# Patient Record
Sex: FEMALE | Race: WHITE | NOT HISPANIC OR LATINO | Employment: OTHER | URBAN - METROPOLITAN AREA
[De-identification: names, ages, dates, MRNs, and addresses within clinical notes are randomized per-mention and may not be internally consistent; named-entity substitution may affect disease eponyms.]

---

## 2022-12-07 ENCOUNTER — OFFICE VISIT (OUTPATIENT)
Dept: NEUROLOGY | Facility: CLINIC | Age: 67
End: 2022-12-07

## 2022-12-07 VITALS
HEIGHT: 63 IN | OXYGEN SATURATION: 98 % | HEART RATE: 72 BPM | SYSTOLIC BLOOD PRESSURE: 126 MMHG | DIASTOLIC BLOOD PRESSURE: 78 MMHG | WEIGHT: 199.8 LBS | BODY MASS INDEX: 35.4 KG/M2

## 2022-12-07 DIAGNOSIS — I67.89 CEREBRAL MICROVASCULAR DISEASE: ICD-10-CM

## 2022-12-07 DIAGNOSIS — R41.3 MEMORY LOSS: ICD-10-CM

## 2022-12-07 DIAGNOSIS — Z82.49 FAMILY HISTORY OF BRAIN ANEURYSM: ICD-10-CM

## 2022-12-07 DIAGNOSIS — G43.009 MIGRAINE WITHOUT AURA AND WITHOUT STATUS MIGRAINOSUS, NOT INTRACTABLE: Primary | ICD-10-CM

## 2022-12-07 PROBLEM — H61.23 BILATERAL IMPACTED CERUMEN: Status: ACTIVE | Noted: 2022-02-02

## 2022-12-07 PROBLEM — C44.310 BASAL CELL CARCINOMA OF FACE: Status: ACTIVE | Noted: 2022-02-16

## 2022-12-07 PROBLEM — J34.89 NASAL LESION: Status: ACTIVE | Noted: 2022-02-02

## 2022-12-07 PROBLEM — I20.9 ANGINA PECTORIS: Status: ACTIVE | Noted: 2022-12-07

## 2022-12-07 PROBLEM — E66.9 OBESITY (BMI 35.0-39.9 WITHOUT COMORBIDITY): Status: ACTIVE | Noted: 2022-02-16

## 2022-12-07 PROBLEM — G25.81 RESTLESS LEGS: Status: ACTIVE | Noted: 2017-02-24

## 2022-12-07 PROBLEM — M19.90 ARTHRITIS: Status: ACTIVE | Noted: 2017-02-24

## 2022-12-07 PROBLEM — E55.9 VITAMIN D DEFICIENCY: Status: ACTIVE | Noted: 2017-10-30

## 2022-12-07 PROBLEM — U07.1 COVID-19: Status: ACTIVE | Noted: 2022-02-14

## 2022-12-07 PROBLEM — Z98.890 HISTORY OF CARDIAC CATHETERIZATION: Status: ACTIVE | Noted: 2022-12-07

## 2022-12-07 PROBLEM — E07.9 DISORDER OF THYROID GLAND: Status: ACTIVE | Noted: 2017-02-24

## 2022-12-07 PROBLEM — H90.3 SENSORINEURAL HEARING LOSS (SNHL) OF BOTH EARS: Status: ACTIVE | Noted: 2022-02-02

## 2022-12-07 PROBLEM — F32.A DEPRESSIVE DISORDER: Status: ACTIVE | Noted: 2017-02-24

## 2022-12-07 PROBLEM — G56.00 CARPAL TUNNEL SYNDROME: Status: ACTIVE | Noted: 2017-02-24

## 2022-12-07 PROBLEM — IMO0002 DEGENERATION OF INTERVERTEBRAL DISC: Status: ACTIVE | Noted: 2017-02-24

## 2022-12-07 PROBLEM — M79.7 FIBROMYALGIA: Status: ACTIVE | Noted: 2017-02-24

## 2022-12-07 PROCEDURE — 99204 OFFICE O/P NEW MOD 45 MIN: CPT | Performed by: NURSE PRACTITIONER

## 2022-12-07 RX ORDER — METHOCARBAMOL 500 MG/1
500 TABLET, FILM COATED ORAL 3 TIMES DAILY
COMMUNITY
Start: 2022-11-21

## 2022-12-07 RX ORDER — UBROGEPANT 50 MG/1
50 TABLET ORAL AS NEEDED
COMMUNITY
Start: 2022-12-01

## 2022-12-07 RX ORDER — ASPIRIN 81 MG/1
81 TABLET ORAL DAILY
COMMUNITY

## 2022-12-07 RX ORDER — OMEPRAZOLE 20 MG/1
20 CAPSULE, DELAYED RELEASE ORAL DAILY
COMMUNITY
Start: 2022-11-21

## 2022-12-07 RX ORDER — POTASSIUM CHLORIDE 750 MG/1
10 TABLET, EXTENDED RELEASE ORAL DAILY
COMMUNITY
Start: 2022-11-21

## 2022-12-07 RX ORDER — FLUTICASONE FUROATE, UMECLIDINIUM BROMIDE AND VILANTEROL TRIFENATATE 100; 62.5; 25 UG/1; UG/1; UG/1
1 POWDER RESPIRATORY (INHALATION)
COMMUNITY
Start: 2022-12-06

## 2022-12-07 RX ORDER — FUROSEMIDE 40 MG/1
40 TABLET ORAL DAILY
COMMUNITY
Start: 2022-11-08

## 2022-12-07 RX ORDER — ALBUTEROL SULFATE 90 UG/1
2 AEROSOL, METERED RESPIRATORY (INHALATION) EVERY 6 HOURS PRN
COMMUNITY
Start: 2022-12-06

## 2022-12-07 RX ORDER — MONTELUKAST SODIUM 10 MG/1
10 TABLET ORAL NIGHTLY
COMMUNITY
Start: 2022-10-28

## 2022-12-07 RX ORDER — MOLNUPIRAVIR 200 MG/1
800 CAPSULE ORAL EVERY 12 HOURS
COMMUNITY
Start: 2022-11-14

## 2022-12-07 RX ORDER — MELOXICAM 15 MG/1
15 TABLET ORAL DAILY
COMMUNITY
Start: 2022-11-21

## 2022-12-07 RX ORDER — HYDROXYZINE HYDROCHLORIDE 25 MG/1
25 TABLET, FILM COATED ORAL EVERY 8 HOURS PRN
COMMUNITY
Start: 2022-11-08

## 2022-12-07 RX ORDER — NYSTATIN 100000 [USP'U]/G
POWDER TOPICAL 2 TIMES DAILY
COMMUNITY
Start: 2022-11-29

## 2022-12-07 RX ORDER — MAGNESIUM OXIDE 400 MG/1
400 TABLET ORAL NIGHTLY
Qty: 90 TABLET | Refills: 3 | Status: SHIPPED | OUTPATIENT
Start: 2022-12-07

## 2022-12-07 RX ORDER — PENTOSAN POLYSULFATE SODIUM 100 MG/1
100 CAPSULE, GELATIN COATED ORAL
COMMUNITY
Start: 2022-11-29

## 2022-12-07 RX ORDER — ROPINIROLE 3 MG/1
3 TABLET, FILM COATED ORAL NIGHTLY
COMMUNITY
Start: 2022-11-29

## 2022-12-07 RX ORDER — HYDROCHLOROTHIAZIDE 25 MG/1
25 TABLET ORAL DAILY
COMMUNITY
Start: 2022-12-06

## 2022-12-07 RX ORDER — SPIRONOLACTONE 25 MG/1
25 TABLET ORAL DAILY
COMMUNITY
Start: 2022-12-06

## 2022-12-07 RX ORDER — CITALOPRAM 40 MG/1
40 TABLET ORAL EVERY MORNING
COMMUNITY
Start: 2022-11-29

## 2022-12-07 RX ORDER — ISOSORBIDE MONONITRATE 30 MG/1
30 TABLET, EXTENDED RELEASE ORAL DAILY
COMMUNITY
Start: 2022-11-08

## 2022-12-07 RX ORDER — LEVOTHYROXINE SODIUM 0.05 MG/1
50 TABLET ORAL
COMMUNITY
Start: 2022-11-21

## 2022-12-07 NOTE — PROGRESS NOTES
Subjective:     Patient ID: Nahed Nj is a 67 y.o. female.    CC:   Chief Complaint   Patient presents with   • Memory Loss       HPI:   History of Present Illness   Nahed Nj is a 67 y.o. female who comes to clinic today for evaluation of memory loss . She has noted symptoms since at least 2020 marked initially by forgetfulness , repetitiveness , word-finding difficulties  and exacerbated by COVID-19 infection in November 2021. Loses her train of thought. This has gradually worsened  over time. Additional symptoms have included impairments in short term memory and daughter helps with paying bills. There have been associated  symptoms of anxiety , depression , agitation  and does have sleep apnea. She denies  impairments in ADL's. She manages her medications , finances and daughter has started helping with bills. She continues to drive.  . She is currently residing at residing at home with , daughter & daughter's male partner and grandchildren.     Today 12/7/2022-This is a 67-year-old female who is referred here for evaluation of memory difficulties. She did undergo an MRI of the brain without contrast at [facility] on 09/12/2022; this was an evaluation for headaches, dizziness, memory loss, and blurred vision. She was referred to neurology for memory complaints. She is being treated for migraines with Ubrelvy as needed. Referred here for further evaluation.    Today, she reports her memory is getting worse. She notes her  recommended that she write her concerns on her phone, which she has brought with her today. Her  drove her here today due to a migraine but stayed in the car. She reports she has had issues with her memory all of her life. She states she is unable to remember things from before the age of 6. She is unsure if she experienced any trauma. She states her memory problems started worsening in 2020, and she discussed this with her primary care provider, who  "then ordered an MRI. She states her memory has worsened since having COVID-19 in 11/2021 and most recently in November 2022. She notes her short-term memory is awful. She states she can be talking to someone, and in the middle of a sentence her memory will \"drop,\" and she will have no idea what the next word was going to be. She states she will think about saying something, forget what it was, and will not be able to remember. She states she is aware that her memory is worsening. She notes she has difficulty with spelling. She reports her family is worried about her memory and that they have noticed a change in her memory. She states she has a family history of Alzheimer's disease in both of her mother's half-sisters. She notes one of her mother's half-sisters may have had vascular dementia. She reports her mother had a ruptured brain aneurysm which led to her death.     Her highest level of education is 12th grade. 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  She denies any head injury, concussion, trauma to her head, or any seizures.    She confirms she is able to get dressed and go to the bathroom independently. She reports her daughter has started paying bills. She puts her medications out 3 weeks at a time, and confirms she is able to manage this herself. She reports she is still driving. She denies any safety concerns in regards to her driving. She states she is still alert when driving, and knows where she is going. She lives at home with her , her daughter, her daughter's partner, and her grandchildren. She notes after her daughter's partner moved in, she was very agitated, but states this has improved. She notes \"there is stress there\" in regards to her living situation.     She reports her headaches are located all over her head, but the headache she has today is located in the front and in the back of her head. She notes her headaches move all around " her head. She states she has had headaches for approximately 2 years, but they are now getting worse. She experienced headaches when she was younger, but these resolved, and then restarted 2 years ago. She does not recall anything bringing her headaches on 2 years ago. She was experiencing daily headaches until last month when she contracted COVID-19 for the second time, and did not experience any headaches while she had COVID-19. Once she recovered from COVID-19, her headaches returned. She experiences phonophobia and photophobia. She reports she gets very stressed and agitated when she has a headache. She describes the pain as dull and throbbing, moderate pain, can last all day and sometimes days. She confirms she was prescribed Ubrelvy by her primary care provider, SARAH Rojas, to take as needed and this is very helpful as needed. She denies taking any medication daily for her headaches specifically. She reports she will occasionally take 4 tablets of Tylenol 500 mg; she denies taking this more than 15 days per month. Beta Blockers would be contraindicated with her COPD. Topamax would not be recommended with her memory loss as this can exacerbate this symptom. She took gabapentin in past and she was taken off due to her memory. She has been on citalopram for her anxiety and depression but this has not helped her migraines. She has not taken depakote.    She notes her vision is getting worse, and believes it is worse with her migraines. She denies experiencing any loss of vision, or spots in her vision. It has been over 1 year since her last eye exam. She notes her vision and hearing worsened with COVID-19, and states her hearing and vision have not returned to normal. She reports she saw an otolaryngologist who advised she has nerve damage, and that her hearing would most likely not return. She reports her hearing and vision difficulties began after she contracted COVID-19 in 11/2021. She notes she  almost  from COVID-19 and was hospitalized for almost 2 weeks. She states she still uses oxygen at night and when she is traveling in the car, and will use this during the day as needed. She notes that she is dizzy and off balance, and states the being off balance is getting worse. She denies experiencing falls; she notes she catches herself.    She confirms she has insomnia, or trouble with sleeping. She states that she will occasionally not go to sleep for 24 hours, which is new for her. She reports she can then also sleep for 24 hours. She confirms she has had a sleep study completed. She reports she has sleep apnea. She states that she has to have another sleep study completed because her granddaughter interfered with the gauges on her machine, so it was not working properly. She states that she did not go back to see her pulmonologist.     She reports her depression and anxiety are both worse. She notes she is agitated. She confirms she is being treated for depression, but has not told her primary care provider that it is getting worse. She reports she recently had her yearly wellness checkup with primary care, but she forgot to mention her other concerns. She confirms she is currently taking citalopram. She denies any delusions or hallucinations. She reports she will occasionally cry if she is unable to remember something.     She denies a history of CVAs or myocardial infarction. She states she was believed to have potentially had a myocardial infarction, but she is unsure if she did or not. She states she has congestive heart failure. She confirms that she is on 2 inhalers and Singulair. Her cardiologist is Dr. Micky Saldivar in [place], [place]. She reports she only takes aspirin when she has chest pains. She confirms she has fibromyalgia. She states she has been treated with gabapentin for her fibromyalgia, and confirms she found this helpful, but reports she was taken off of this when she had COVID-19  due to concerns of this affecting her memory. She states she is now taking something else for her fibromyalgia. She reports she took Tegretol for her trigeminal neuralgia. She states she has lupus and Raynaud's disease. She confirms she takes thyroid medication daily. She denies having any high blood pressure, high cholesterol, or diabetes. She denies ever being a smoker. She reports she has been experiencing chest pains more frequently in the past 2 weeks. She states she forgot to tell her primary care provider about her chest pains.    She states she has trouble ambulating due to her back pain. She reports she has degeneration, and notes when she sits for a few minutes, it takes her a while to get up. She confirms she uses a cane or a walker. She denies seeing a pain specialist or an orthopedic specialist. She has not completed physical therapy. She reports she had to get an injection in her back last week because it was spasming, but did not find this as helpful as she has in the past. She notes she is unsure if her back is getting worse.     The following portions of the patient's history were reviewed and updated as appropriate: allergies, current medications, past family history, past medical history, past social history, past surgical history and problem list.    Past Medical History:   Diagnosis Date   • Asthma    • Basal cell carcinoma    • Bladder problem    • CAD (coronary artery disease)    • Carpal tunnel syndrome    • Congestive heart failure (CHF) (HCC)    • Depression    • Difficulty walking    • HL (hearing loss)    • Lupus (HCC)    • Memory loss    • Migraine    • Movement disorder    • Osteoporosis    • Thyroid disease    • Trigeminal neuralgia        Past Surgical History:   Procedure Laterality Date   • BASAL CELL CARCINOMA EXCISION  2022    [facility] (2020,2021 and 2022)   • BLADDER SURGERY  2013    [facility]   • BREAST LUMPECTOMY  1971   • CHOLECYSTECTOMY  1990   •  HYSTERECTOMY  1978    [facility] [place]       Social History     Socioeconomic History   • Marital status:    Tobacco Use   • Smoking status: Never   • Smokeless tobacco: Never   Vaping Use   • Vaping Use: Never used   Substance and Sexual Activity   • Alcohol use: Never   • Drug use: Never   • Sexual activity: Defer       Family History   Problem Relation Age of Onset   • Heart disease Mother    • Arthritis Mother    • Aneurysm Mother    • Diabetes Father    • Heart disease Father    • Neuropathy Father    • Heart disease Sister    • Arthritis Sister    • Dementia Sister    • Kidney disease Sister    • Heart disease Brother    • Arthritis Brother    • Alzheimer's disease Maternal Aunt    • Breast cancer Maternal Aunt    • Migraines Daughter    • Arthritis Daughter    • Osteoporosis Daughter    • Arthritis Niece    • Brain cancer Niece    • Migraines Niece    • Mental illness Nephew           Current Outpatient Medications:   •  albuterol sulfate  (90 Base) MCG/ACT inhaler, Inhale 2 puffs Every 6 (Six) Hours As Needed for Wheezing., Disp: , Rfl:   •  aspirin 81 MG EC tablet, Take 81 mg by mouth Daily., Disp: , Rfl:   •  citalopram (CeleXA) 40 MG tablet, Take 40 mg by mouth Every Morning., Disp: , Rfl:   •  Elmiron 100 MG capsule, Take 100 mg by mouth 3 (Three) Times a Day Before Meals., Disp: , Rfl:   •  furosemide (LASIX) 40 MG tablet, Take 40 mg by mouth Daily., Disp: , Rfl:   •  hydroCHLOROthiazide (HYDRODIURIL) 25 MG tablet, Take 25 mg by mouth Daily., Disp: , Rfl:   •  hydrOXYzine (ATARAX) 25 MG tablet, Take 25 mg by mouth Every 8 (Eight) Hours As Needed., Disp: , Rfl:   •  isosorbide mononitrate (IMDUR) 30 MG 24 hr tablet, Take 30 mg by mouth Daily., Disp: , Rfl:   •  Lagevrio 200 MG capsule, Take 800 mg by mouth Every 12 (Twelve) Hours., Disp: , Rfl:   •  levothyroxine (SYNTHROID, LEVOTHROID) 50 MCG tablet, Take 50 mcg by mouth Every Morning., Disp: , Rfl:   •  magnesium oxide (MAG-OX) 400 MG  tablet, Take 1 tablet by mouth Every Night., Disp: 90 tablet, Rfl: 3  •  meloxicam (MOBIC) 15 MG tablet, Take 15 mg by mouth Daily., Disp: , Rfl:   •  methocarbamol (ROBAXIN) 500 MG tablet, Take 500 mg by mouth 3 (Three) Times a Day., Disp: , Rfl:   •  montelukast (SINGULAIR) 10 MG tablet, Take 10 mg by mouth Every Night., Disp: , Rfl:   •  nystatin (MYCOSTATIN) 813300 UNIT/GM powder, Apply  topically to the appropriate area as directed 2 (Two) Times a Day., Disp: , Rfl:   •  omeprazole (priLOSEC) 20 MG capsule, Take 20 mg by mouth Daily., Disp: , Rfl:   •  potassium chloride (K-DUR,KLOR-CON) 10 MEQ CR tablet, Take 10 mEq by mouth Daily., Disp: , Rfl:   •  Riboflavin 100 MG capsule, Take 200 mg by mouth Every Night., Disp: 180 each, Rfl: 3  •  rOPINIRole (REQUIP) 3 MG tablet, Take 3 mg by mouth Every Night., Disp: , Rfl:   •  spironolactone (ALDACTONE) 25 MG tablet, Take 25 mg by mouth Daily., Disp: , Rfl:   •  Trelegy Ellipta 100-62.5-25 MCG/ACT inhaler, Inhale 1 puff Daily., Disp: , Rfl:   •  ubrogepant tablet, 50 mg As Needed., Disp: , Rfl:      Review of Systems   Constitutional: Negative for chills, fatigue, fever and unexpected weight change.   HENT: Positive for hearing loss. Negative for ear pain, nosebleeds, rhinorrhea and sore throat.    Eyes: Positive for photophobia and visual disturbance. Negative for pain, discharge and itching.   Respiratory: Negative for cough, chest tightness, shortness of breath and wheezing.    Cardiovascular: Positive for chest pain (intermittent but none today, comes and goes, has had this often, knows to contact cardiology and pcp-also if this returns to go to ER). Negative for palpitations and leg swelling.   Gastrointestinal: Negative for abdominal pain, blood in stool, constipation, diarrhea, nausea and vomiting.   Genitourinary: Negative for dysuria, frequency, hematuria and urgency.   Musculoskeletal: Positive for back pain and gait problem. Negative for arthralgias, joint  "swelling, myalgias, neck pain and neck stiffness.   Skin: Negative for rash and wound.   Allergic/Immunologic: Negative for environmental allergies and food allergies.   Neurological: Positive for dizziness and headaches. Negative for tremors, seizures, syncope, speech difficulty, weakness, light-headedness and numbness.   Hematological: Negative for adenopathy. Does not bruise/bleed easily.   Psychiatric/Behavioral: Positive for agitation, behavioral problems, decreased concentration, dysphoric mood and sleep disturbance. Negative for confusion, hallucinations and suicidal ideas. The patient is nervous/anxious.         Objective:  /78   Pulse 72   Ht 160 cm (63\")   Wt 90.6 kg (199 lb 12.8 oz)   SpO2 98%   BMI 35.39 kg/m²     Neurologic Exam     Mental Status   Oriented to person.   Oriented to place.   Disoriented to time.   Registration: recalls 3 of 3 objects. Recall at 5 minutes: recalls 3 of 3 objects. Follows 3 step commands.   Attention: decreased. Concentration: decreased.   Speech: speech is normal   Level of consciousness: alert  Knowledge: good. Able to perform simple calculations.   Able to name object. Able to read. Able to repeat. Able to write. Abnormal comprehension.     Cranial Nerves   Cranial nerves II through XII intact.     Motor Exam   Muscle bulk: normal  Overall muscle tone: normal    Strength   Strength 5/5 throughout.     Gait, Coordination, and Reflexes     Gait  Gait: wide-based (severe antalgia, notes severe low back pain chronic and poor balance, reports this improves with more ambulation, no assistive device)    Coordination   Romberg: negative  Finger to nose coordination: normal  Heel to shin coordination: normal  Tandem walking coordination: normal    Tremor   Resting tremor: absent  Intention tremor: absent  Action tremor: absent    Reflexes   Right brachioradialis: 2+  Left brachioradialis: 2+  Right biceps: 2+  Left biceps: 2+  Right patellar: 2+  Left patellar: " 2+  Right achilles: 2+  Left achilles: 2+  Right : 2+  Left : 2+  Right plantar: normal  Left plantar: normal  Right Healy: absent  Left Healy: absent  Right ankle clonus: absent  Left ankle clonus: absent      Physical Exam  Constitutional:       Appearance: Normal appearance. She is obese.      Comments: BMI 34.5   Cardiovascular:      Rate and Rhythm: Normal rate and regular rhythm.      Heart sounds: Normal heart sounds, S1 normal and S2 normal.   Pulmonary:      Effort: Pulmonary effort is normal.      Breath sounds: Normal breath sounds.   Musculoskeletal:      Lumbar back: Spasms and tenderness present. No swelling, edema, deformity, signs of trauma, lacerations or bony tenderness. Decreased range of motion. Negative right straight leg raise test and negative left straight leg raise test. No scoliosis.      Comments:   Reports chronic and just had injections last week   Neurological:      Mental Status: She is alert.      Cranial Nerves: Cranial nerves 2-12 are intact.      Motor: Motor strength is normal.      Coordination: Finger-Nose-Finger Test, Heel to Shin Test and Romberg Test normal.      Gait: Tandem walk normal.      Deep Tendon Reflexes:      Reflex Scores:       Bicep reflexes are 2+ on the right side and 2+ on the left side.       Brachioradialis reflexes are 2+ on the right side and 2+ on the left side.       Patellar reflexes are 2+ on the right side and 2+ on the left side.       Achilles reflexes are 2+ on the right side and 2+ on the left side.  Psychiatric:         Mood and Affect: Mood is anxious and depressed.         Speech: Speech normal.         Behavior: Behavior normal.         Thought Content: Thought content normal.         Cognition and Memory: She exhibits impaired recent memory (reported).         Judgment: Judgment normal.     MMSE is a 28 out of 30, with 3 out of 3 for word recall today.    Results:  She did undergo an MRI of the brain without contrast at [facility] on 09/12/2022. Per radiology, no acute intracranial abnormalities. Found moderate white matter changes mildly progressed compared to 11/17/2020 imaging; likely secondary to chronic microvascular ischemic changes. No other abnormalities.    I was able to personally review MRI, and she does have some moderate white matter changes around the left frontal horn, greater than the right frontal horn, but again no acute abnormalities. No significant brainstem or cerebellar abnormalities noted.    Her vitamin D level with PCP on 08/10/2022 was 29.4 ng/mL.   CMP within normal limits.   CBC overall looked good.   Vitamin B12 level 911 pg/mL.  Folate 7.4 ng/mL.     Assessment/Plan:       Diagnoses and all orders for this visit:    1. Migraine without aura and without status migrainosus, not intractable (Primary)  -     magnesium oxide (MAG-OX) 400 MG tablet; Take 1 tablet by mouth Every Night.  Dispense: 90 tablet; Refill: 3  -     Riboflavin 100 MG capsule; Take 200 mg by mouth Every Night.  Dispense: 180 each; Refill: 3    2. Memory loss  Comments:  multifactorial  Orders:  -     Ambulatory Referral to Speech Therapy    3. Family history of brain aneurysm  Comments:  mom with ruptured brain aneurysm-needs MRA head screening  Orders:  -     MRI Angiogram Head With & Without Contrast; Future    4. Cerebral microvascular disease  Comments:  aspirin 81mg daily         She has multifactorial etiology for her symptoms. For now, we are going to start her on supplements to help with her migraines. Triptans would be contraindicated, would not recommend Topamax. Previously tried gabapentin, and this worsened her memory. Cannot take beta blockers with baseline respiratory issues. Discussed anxiety, depression, fibromyalgia, chronic pain, and sleep apnea can all negatively affect memory.     MMSE was reassuring today. We will complete a Jay cognitive assessment next time, and we have referred her for cognitive rehab  with our speech therapist.     I recommended she take aspirin 81 mg daily. She does have moderate chronic small vessel ischemic changes. Recommended fasting lipid panel with PCP when able. Ventricles appear normal in size. I currently do not suspect a neurodegenerative etiology of her symptoms, but we will continue to monitor her closely.    She also has some pretty significant gait issues with her low back pain, and I discussed talking with her PCP about doing some physical therapy or seeing a specialist for that. She tells me she does not have time for physical therapy as she is helping take care of her grandchild. I did recommend she get evaluated by primary care, and consider seeing a specialist.    She should continue to follow with all specialists. If her headaches are not improving, we could certainly consider low dose Depakote, or consider an injection for CGRP for prevention. Would need to get clearance from her cardiologist, Micky Saldivar M.D. In regards to intermittent chest pain, not present today, she should contact PCP and Cardiology. If this become worsened or constant, she should proceed to nearest ER.    She verbalizes understanding, and agrees with plan today. She is going to follow up in 6 months.    Reviewed medications, potential side effects and signs and symptoms to report. Discussed risk versus benefits of treatment plan with patient and/or family-including medications, labs and radiology that may be ordered. Addressed questions and concerns during visit. Patient and/or family verbalized understanding and agree with plan.    AS THE PROVIDER, I PERSONALLY WORE PPE DURING ENTIRE FACE TO FACE ENCOUNTER IN CLINIC WITH THE PATIENT. PATIENT ALSO WORE PPE DURING ENTIRE FACE TO FACE ENCOUNTER EXCEPT FOR A MAX OF 30 SECONDS DURING NEUROLOGICAL EVALUATION OF CRANIAL NERVES AND THEN MASK WAS PLACED BACK OVER PATIENT FACE FOR REMAINDER OF VISIT. I WASHED MY HANDS BEFORE AND AFTER VISIT.    During this  visit the following were done:  Labs Reviewed [x]    Labs Ordered []    Radiology Reports Reviewed [x]    Radiology Ordered [x]    PCP Records Reviewed [x]    Referring Provider Records Reviewed []    ER Records Reviewed []    Hospital Records Reviewed []    History Obtained From Family []    Radiology Images Reviewed [x]    Other Reviewed []    Records Requested []      Transcribed from ambient dictation for SARAH Gamino by Dorinda Díaz.  12/07/22   18:05 EST    Patient or patient representative verbalized consent to the visit recording.  I have personally performed the services described in this document as transcribed by the above individual, and it is both accurate and complete.  SARAH Gamino  12/8/2022  06:52 EST

## 2023-01-06 ENCOUNTER — TELEPHONE (OUTPATIENT)
Dept: NEUROLOGY | Facility: CLINIC | Age: 68
End: 2023-01-06
Payer: MEDICARE

## 2023-01-06 DIAGNOSIS — Z82.49 FAMILY HISTORY OF BRAIN ANEURYSM: Primary | ICD-10-CM

## 2023-01-06 NOTE — TELEPHONE ENCOUNTER
Provider: NELLIE FOOTE  Caller: JULISSA PEREZ [facility] SCHEDULING  Relationship to Patient: NA  Phone Number: [phone]  Reason for Call: JULISSA CALLED TO STATE THE ORDERS FOR PATIENT'S MRI SHOULD STATE MRA HEAD WITHOUT CONTRAST INSTEAD OF MRI Angiogram Head With & Without Contrast. PLEASE REFAX ORDERS.    FAX# [phone]    THANK YOU.

## 2023-01-09 ENCOUNTER — HOSPITAL ENCOUNTER (OUTPATIENT)
Dept: SPEECH THERAPY | Facility: HOSPITAL | Age: 68
Setting detail: THERAPIES SERIES
Discharge: HOME | End: 2023-01-09
Payer: MEDICARE

## 2023-01-09 DIAGNOSIS — K21.9 CHRONIC GERD: ICD-10-CM

## 2023-01-09 DIAGNOSIS — R41.3 MEMORY LOSS: Primary | ICD-10-CM

## 2023-01-09 PROCEDURE — 92523 SPEECH SOUND LANG COMPREHEN: CPT

## 2023-01-09 NOTE — THERAPY EVALUATION
Outpatient Speech Language Pathology   Adult Speech Language Cognitive Initial Evaluation  [facility]     Patient Name: Nahed Nj  : 1955  MRN: [id number]  Today's Date: 2023        Visit Date: 2023   Patient Active Problem List   Diagnosis   • Angina pectoris (HCC)   • Arthritis   • Basal cell carcinoma of face   • Bilateral impacted cerumen   • Carpal tunnel syndrome   • COVID-19   • Degeneration of intervertebral disc   • Depressive disorder   • Disorder of thyroid gland   • Fibromyalgia   • History of cardiac catheterization   • Nasal lesion   • Obesity (BMI 35.0-39.9 without comorbidity)   • Restless legs   • Sensorineural hearing loss (SNHL) of both ears   • Vitamin D deficiency   • Migraine without aura and without status migrainosus, not intractable   • Memory loss   • Family history of brain aneurysm   • Cerebral microvascular disease        Past Medical History:   Diagnosis Date   • Asthma    • Basal cell carcinoma    • Bladder problem    • CAD (coronary artery disease)    • Carpal tunnel syndrome    • Congestive heart failure (CHF) (HCC)    • Depression    • Difficulty walking    • HL (hearing loss)    • Lupus (HCC)    • Memory loss    • Migraine    • Movement disorder    • Osteoporosis    • Thyroid disease    • Trigeminal neuralgia         Past Surgical History:   Procedure Laterality Date   • BASAL CELL CARCINOMA EXCISION      Nose Paola Chang (, and )   • BLADDER SURGERY      [facility]   • BREAST LUMPECTOMY     • CHOLECYSTECTOMY     • HYSTERECTOMY      PARTIAL [place]         Visit Dx:    ICD-10-CM ICD-9-CM   1. Memory loss  R41.3 780.93            OP SLP Assessment/Plan - 23 1400        SLP Assessment    Functional Problems Speech Language- Adult/Cognition  -    Impact on Function: Adult Speech Language/Cognition Restrictions in personal and social life;Trouble learning or remembering new information;Poor attention  to task  -HG    Clinical Impression: Speech Language-Adult/Congnition Mild:;Cognitive Communication Impairment  -HG    Functional Problems Comment Pt reports difficulty with recall of recent information,  confirms  -HG    Clinical Impression Comments RBANS Total score of 110 places pt in the High average.  -HG    Please refer to paper survey for additional self-reported information Yes  -HG    Please refer to items scanned into chart for additional diagnostic informaiton and handouts as provided by clinician Yes  -HG    SLP Diagnosis Mild Cog comm impairment  -HG    Prognosis Excellent (comment)  -HG    Patient/caregiver participated in establishment of treatment plan and goals Yes  -HG    Patient would benefit from skilled therapy intervention Yes  -HG       SLP Plan    Frequency 1x every other week  -HG    Duration 12 weeks  -HG    Planned CPT's? SLP SPEECH & LANGUAGE EVAL: 97285;SLP INDIVIDUAL SPEECH THERAPY: 32167  -    Expected Duration of Therapy Session (SLP Eval) 60  -HG    Plan Comments Initiate Cog tx.  -HG          User Key  (r) = Recorded By, (t) = Taken By, (c) = Cosigned By    Initials Name Provider Type     Mel Valdes MS CCC-SLP Speech and Language Pathologist                 SLP SLC Evaluation - 01/09/23 1400        Communication Assessment/Intervention    Document Type evaluation  -HG    Subjective Information no complaints  -HG    Patient Observations alert;cooperative;agree to therapy  -HG    Patient/Family/Caregiver Comments/Observations  present and notes that she keeps a headache. The headaches fluctuates in severity but she \"keeps it\".  -HG    Patient Effort excellent  -HG    Symptoms Noted During/After Treatment none  -HG       General Information    Patient Profile Reviewed yes  -HG    Pertinent History Of Current Problem Pt is a 67 year old female referred from Neurology for memory loss. Pt notes marked memory loss in 2020 but increased deficits following Covid in  2019. Pt takes care of her medications, live-in dtr organizes the finances and  pays the bills. Pt helps takes care of her 4 year old granddaughter with autism.  -HG    Precautions/Limitations, Vision WFL with corrective lenses  -HG    Precautions/Limitations, Hearing WFL;for purposes of eval  -HG    Patient Level of Education 12th grade  -HG    Prior Level of Function-Communication WFL  -HG    Plans/Goals Discussed with patient;spouse/S.O.  -HG    Barriers to Rehab none identified  -HG    Patient's Goals for Discharge functional cognition;functional communication  -HG    Family Goals for Discharge functional cognition;functional communication  -HG    Standardized Assessment Used RBANS  -HG       Auditory Comprehension Assessment/Intervention    Auditory Comprehension (Communication) WFL  -HG       Reading Comprehension Assessment/Intervention    Reading Comprehension (Communication) WFL  -HG       Verbal Expression Assessment/Intervention    Verbal Expression WFL  -HG       Graphic Expression Assessment/Intervention    Graphic Expression WFL  -HG       Oral Motor Structure and Function    Oral Motor Structure and Function WFL  -HG    Dentition Assessment natural, present and adequate  -HG    Mucosal Quality moist, healthy  -HG       Oral Musculature and Cranial Nerve Assessment    Oral Motor General Assessment WFL  -HG       Motor Speech Assessment/Intervention    Motor Speech Function WFL  -HG       Cursory Voice Assessment/Intervention    Quality and Resonance (Voice) WFL  -HG       Cognitive Assessment Intervention- SLP    Cognitive Function (Cognition) WFL;mild impairment  -HG    Orientation Status (Cognition) WFL  -HG    Memory (Cognitive) simple;immediate;related;unrelated;WFL;short-term;delayed;mild impairment  -HG    Attention (Cognitive) sustained;moderate impairment;alternating;mild impairment  -HG    Thought Organization (Cognitive) concrete divergent;WFL  -HG    Cognition, Comment Reasoning and  problem solving to be assessed  -HG       RBANS- Repeatable Battery for the Assessment of Neuropsychological Status    Immediate Memory Index Score 114  -HG    Immediate Memory Percentile 82 %  -HG    Immediate Memory Qualitative Description high average  -HG    Visuospatial Index Score 116  -HG    Visuospatial Percentile 86 %  -HG    Visuospatial Qualitative Description high average  -HG    Language Index Score 108  -HG    Language Percentile 70 %  -HG    Language Qualitative Description average  -HG    Attention Index Score 75  -HG    Attention Percentile 5 %  -HG    Attention Qualitative Description borderline  -HG    Delayed Memory Index Score 126  -HG    Delayed Memory Percentile 96 %  -HG    Delayed Memory Qualitative Description superior  -HG    Total Index Score 539  -HG    Total Percentile 75 %  -HG    Total Qualitative Description high average  -HG          User Key  (r) = Recorded By, (t) = Taken By, (c) = Cosigned By    Initials Name Provider Type    Mel Robledo MS CCC-SLP Speech and Language Pathologist                                OP SLP Education     Row Name 01/09/23 1400       Education    Barriers to Learning No barriers identified  -    Education Provided Patient demonstrated recommended strategies;Family/caregivers demonstrated recommended strategies;Patient requires further education on strategies, risks;Family/caregivers require further education on strategies, risks  -    Assessed Learning needs;Learning motivation;Learning preferences;Learning readiness  -    Learning Motivation Strong  -    Learning Method Explanation;Demonstration;Teach back;Written materials  -    Teaching Response Verbalized understanding;Demonstrated understanding;Reinforcement needed  -    Education Comments Explained the POC is typically 1x/week for 12 weeks but due to distance for travel to treating therapist, pt will be seen bi-weekly. Will review results next session.  -          User Key   (r) = Recorded By, (t) = Taken By, (c) = Cosigned By    Initials Name Effective Dates    HG LucioMel MS SHARI CCC-SLP 06/16/21 -                SLP OP Goals     Row Name 01/09/23 1400          Goal Type Needed    Goal Type Needed Memory;Attention/Orientation;Other Adult Goals  -HG        Subjective Comments    Subjective Comments Pt alert, cooperative, accompanied with .  -HG        Memory Goals    Patient and family will implement compensatory strategies to maximize patient’s Memory function so patient can continue to participate in daily activities 90%:;with cues  -HG     Status: Patient and family will implement compensatory strategies to maximize patient’s Memory function so patient can continue to participate in daily activities New  -HG     Patient will demonstrate improved ability to recall information by immediately recalling a series of words 80%:;unrelated;with no delay;with cues  -HG     Status: Patient will demonstrate improved ability to recall information by immediately recalling a series of words New  -HG     Patient’s memory skills will be enhanced as reported by patient by utilizing internal memory strategies to recall up to 3 pieces of information after a 5- minute delay 80%:;with cues  -HG     Status: Patient’s memory skills will be enhanced as reported by patient by utilizing internal memory strategies to recall up to 3 pieces of information after a 5- minute delay New  -HG     Patient’s memory skills will be enhanced as reported by patient by using external memory aides 80%:;with cues  -HG     Status: Patient’s memory skills will be enhanced as reported by patient by using external memory aides New  -HG        Attention/Orientation Goals    Patient will be able to participate in the community safely Independently;With Cues  -HG     Status: Patient will be able to participate in the community safely New  -HG     Patient will improve attention skills by sustaining focus in order to  actively hold and manipulate information provided (e.g., sequencing auditorily presented number series in ascending or descending order) 80%:;with cues  -HG     Status: Patient will improve attention skills by sustaining focus in order to actively hold and manipulate information provided (e.g., sequencing auditorily presented number series in ascending or descending order) New  -HG     Patient will improve attention skills by focusing to selective target/task when presented with competing stimuli or in a distracting environment in order to complete task 80%:;with cues  -HG     Status: Patient will improve attention skills by focusing to selective target/task when presented with competing stimuli or in a distracting environment in order to complete task New  -HG     Patient will improve attention skills by alternating or shifting focus between two different tasks in order to complete both tasks 80%:;with cues  -HG     Status: Patient will improve attention skills by alternating or shifting focus between two different tasks in order to complete both tasks New  -HG     Patient will improve attention skills by dividing focus and responding simultaneously to multiple tasks or in order to complete task 80%:;with cues  -HG     Status: Patient will improve attention skills by dividing focus and responding simultaneously to multiple tasks or in order to complete task New  -HG     Patient will improve attention skills by sustaining focus to high-level cognitive tasks in order to complete task 80%:;with cues  -HG     Status: Patient will improve attention skills by sustaining focus to high-level cognitive tasks in order to complete task New  -HG        Other Goals    Other Adult Goal- 1 Pt will complete reasoning and problem solving assessment with recs to follow as indicated.  -HG     Status: Other Adult Goal- 1 New  -HG     Other Adult Goal- 2 Pt will improve RBANS Total score to at leas a 120, placing pt in the Superior  range.  -        SLP Time Calculation    SLP Goal Re-Cert Due Date 04/09/23  -           User Key  (r) = Recorded By, (t) = Taken By, (c) = Cosigned By    Initials Name Provider Type     Mel Valdes MS CCC-SLP Speech and Language Pathologist              SLP did ask about swallowing and pt reports difficulty with saliva, food, liquids and sometimes \"just air\".  Pt given Reflux Symptom Index and scored a 20. Greater than 10 is abnormal. Pt reports taking omeprazole as needed. SLP to order MBS for further assessment of \"sliding\" sensation in pt's throat.        Time Calculation:   SLP Start Time: 1400  Untimed Charges  01555-XU Eval Speech and Production w/ Language Minutes: 75  Total Minutes  Untimed Charges Total Minutes: 75   Total Minutes: 75    Therapy Charges for Today     Code Description Service Date Service Provider Modifiers Qty    [id number]  ST EVAL SPEECH AND PROD W LANG  5 1/9/2023 Mel Valdes MS CCC-SLP GN 1                   Mel Valdes MS CCC-SLP  1/9/2023

## 2023-01-09 NOTE — TELEPHONE ENCOUNTER
With [facility] we always order the mra head with and without contrast, however, if the facility can only do MRA head without then I can place a new order. I placed the order. Can fax it. Thanks, Hafsa

## 2023-01-12 DIAGNOSIS — R13.10 DYSPHAGIA, UNSPECIFIED TYPE: ICD-10-CM

## 2023-01-12 DIAGNOSIS — K21.9 GASTROESOPHAGEAL REFLUX DISEASE, UNSPECIFIED WHETHER ESOPHAGITIS PRESENT: Primary | ICD-10-CM

## 2023-03-17 ENCOUNTER — TELEPHONE (OUTPATIENT)
Dept: SPEECH THERAPY | Facility: HOSPITAL | Age: 68
End: 2023-03-17
Payer: MEDICARE

## 2023-03-17 DIAGNOSIS — R41.3 MEMORY LOSS: Primary | ICD-10-CM

## 2023-03-17 NOTE — TELEPHONE ENCOUNTER
"Pt called SLP to cancel appt for this date. Pt unable to get to appt. SLP asked pt if she could like to continue with skilled tx and she said \"yes, my memory is getting worse.\" Pt is scheduled for 3/31/23 to continue POC.   "

## 2023-04-07 ENCOUNTER — TELEPHONE (OUTPATIENT)
Dept: NEUROLOGY | Facility: CLINIC | Age: 68
End: 2023-04-07
Payer: MEDICARE

## 2023-04-07 NOTE — TELEPHONE ENCOUNTER
Called patient, and the daughter showing on the emergency contact.  Both numbers would not connect.  Please confirm phone numbers if patient calls.

## 2023-05-05 ENCOUNTER — DOCUMENTATION (OUTPATIENT)
Dept: SPEECH THERAPY | Facility: HOSPITAL | Age: 68
End: 2023-05-05
Payer: MEDICARE

## 2023-05-05 DIAGNOSIS — R41.3 MEMORY LOSS: Primary | ICD-10-CM

## 2023-05-05 NOTE — THERAPY DISCHARGE NOTE
Speech Language Pathology Discharge Summary         Patient Name: Nahed Nj  : 1955  MRN: [id number]    Today's Date: 2023       SLP OP Goals     Row Name 23 1400          Goal Type Needed    Goal Type Needed Memory;Attention/Orientation;Other Adult Goals  -HG        Subjective Comments    Subjective Comments Pt seen for evaluation only. Pt unable to make any scheduled appts. Consistent appts made for follow up but pt did make SLP aware of need to cancel appts.  -HG        Memory Goals    Patient and family will implement compensatory strategies to maximize patient’s Memory function so patient can continue to participate in daily activities 90%:;with cues  -HG     Status: Patient and family will implement compensatory strategies to maximize patient’s Memory function so patient can continue to participate in daily activities New  -HG     Patient will demonstrate improved ability to recall information by immediately recalling a series of words 80%:;unrelated;with no delay;with cues  -HG     Status: Patient will demonstrate improved ability to recall information by immediately recalling a series of words New  -HG     Patient’s memory skills will be enhanced as reported by patient by utilizing internal memory strategies to recall up to 3 pieces of information after a 5- minute delay 80%:;with cues  -HG     Status: Patient’s memory skills will be enhanced as reported by patient by utilizing internal memory strategies to recall up to 3 pieces of information after a 5- minute delay New  -HG     Patient’s memory skills will be enhanced as reported by patient by using external memory aides 80%:;with cues  -HG     Status: Patient’s memory skills will be enhanced as reported by patient by using external memory aides New  -HG        Attention/Orientation Goals    Patient will be able to participate in the community safely Independently;With Cues  -HG     Status: Patient will be able to participate in the  community safely New  -HG     Patient will improve attention skills by sustaining focus in order to actively hold and manipulate information provided (e.g., sequencing auditorily presented number series in ascending or descending order) 80%:;with cues  -HG     Status: Patient will improve attention skills by sustaining focus in order to actively hold and manipulate information provided (e.g., sequencing auditorily presented number series in ascending or descending order) New  -HG     Patient will improve attention skills by focusing to selective target/task when presented with competing stimuli or in a distracting environment in order to complete task 80%:;with cues  -HG     Status: Patient will improve attention skills by focusing to selective target/task when presented with competing stimuli or in a distracting environment in order to complete task New  -HG     Patient will improve attention skills by alternating or shifting focus between two different tasks in order to complete both tasks 80%:;with cues  -HG     Status: Patient will improve attention skills by alternating or shifting focus between two different tasks in order to complete both tasks New  -HG     Patient will improve attention skills by dividing focus and responding simultaneously to multiple tasks or in order to complete task 80%:;with cues  -HG     Status: Patient will improve attention skills by dividing focus and responding simultaneously to multiple tasks or in order to complete task New  -HG     Patient will improve attention skills by sustaining focus to high-level cognitive tasks in order to complete task 80%:;with cues  -HG     Status: Patient will improve attention skills by sustaining focus to high-level cognitive tasks in order to complete task New  -HG        Other Goals    Other Adult Goal- 1 Pt will complete reasoning and problem solving assessment with recs to follow as indicated.  -HG     Status: Other Adult Goal- 1 New  -HG      Other Adult Goal- 2 Pt will improve RBANS Total score to at leas a 120, placing pt in the Superior range.  -        SLP Time Calculation    SLP Goal Re-Cert Due Date 04/09/23  -           User Key  (r) = Recorded By, (t) = Taken By, (c) = Cosigned By    Initials Name Provider Type    Mel Robledo MS CCC-SLP Speech and Language Pathologist                       Time Calculation:                    Mel Valdes MS CCC-SLP  5/5/2023